# Patient Record
Sex: MALE | Race: WHITE | NOT HISPANIC OR LATINO | Employment: UNEMPLOYED | URBAN - METROPOLITAN AREA
[De-identification: names, ages, dates, MRNs, and addresses within clinical notes are randomized per-mention and may not be internally consistent; named-entity substitution may affect disease eponyms.]

---

## 2021-04-08 DIAGNOSIS — Z23 ENCOUNTER FOR IMMUNIZATION: ICD-10-CM

## 2023-11-05 ENCOUNTER — OFFICE VISIT (OUTPATIENT)
Dept: URGENT CARE | Facility: CLINIC | Age: 68
End: 2023-11-05
Payer: COMMERCIAL

## 2023-11-05 VITALS
RESPIRATION RATE: 20 BRPM | DIASTOLIC BLOOD PRESSURE: 76 MMHG | SYSTOLIC BLOOD PRESSURE: 149 MMHG | HEART RATE: 74 BPM | OXYGEN SATURATION: 95 % | TEMPERATURE: 100 F

## 2023-11-05 DIAGNOSIS — S29.012A STRAIN OF LATISSIMUS DORSI MUSCLE, INITIAL ENCOUNTER: Primary | ICD-10-CM

## 2023-11-05 PROCEDURE — S9083 URGENT CARE CENTER GLOBAL: HCPCS | Performed by: PREVENTIVE MEDICINE

## 2023-11-05 PROCEDURE — 99203 OFFICE O/P NEW LOW 30 MIN: CPT | Performed by: PREVENTIVE MEDICINE

## 2023-11-05 RX ORDER — ALBUTEROL SULFATE 90 UG/1
AEROSOL, METERED RESPIRATORY (INHALATION)
COMMUNITY

## 2023-11-05 NOTE — PATIENT INSTRUCTIONS
Ice 4 times a day. 2 ibuprofen 4 times a day. Smear Voltaren cream 3 times a day over the tender area.

## 2023-11-05 NOTE — PROGRESS NOTES
[facility]        NAME: Ruby Miller is a 77 y.o. male  : 1955    MRN: [id number]  DATE: 2023  TIME: 9:36 AM    Assessment and Plan   Strain of latissimus dorsi muscle, initial encounter [L23.659J]  6. Strain of latissimus dorsi muscle, initial encounter              Patient Instructions       Follow up with PCP in 3-5 days. Proceed to  ER if symptoms worsen. Chief Complaint     Chief Complaint   Patient presents with    Back Pain     Started 3 days ago. Had been moving furniture the day before. Pain is on L middle back. States of discomfort with deep breath. History of Present Illness       Moving furniture he felt sudden pain in the left upper back. Hurts when he deep breathes or turns. Back Pain        Review of Systems   Review of Systems   Musculoskeletal:  Positive for back pain. Current Medications       Current Outpatient Medications:     albuterol (PROVENTIL HFA,VENTOLIN HFA) 90 mcg/act inhaler, INHALE 2 PUFFS EVERY 6 HOURS AS NEEDED FOR WHEEZING, Disp: , Rfl:     Current Allergies     Allergies as of 2023    (No Known Allergies)            The following portions of the patient's history were reviewed and updated as appropriate: allergies, current medications, past family history, past medical history, past social history, past surgical history and problem list.     No past medical history on file. No past surgical history on file. No family history on file. Medications have been verified. Objective   /76   Pulse 74   Temp 100 °F (37.8 °C) (Temporal)   Resp 20   SpO2 95%   No LMP for male patient. Physical Exam     Physical Exam  Musculoskeletal:      Comments: Point tenderness over the insertion of the left upper latissimus dorsi muscle.

## 2024-08-27 ENCOUNTER — OFFICE VISIT (OUTPATIENT)
Dept: URGENT CARE | Facility: CLINIC | Age: 69
End: 2024-08-27
Payer: COMMERCIAL

## 2024-08-27 VITALS
HEIGHT: 66 IN | RESPIRATION RATE: 18 BRPM | WEIGHT: 225 LBS | TEMPERATURE: 98.3 F | OXYGEN SATURATION: 95 % | DIASTOLIC BLOOD PRESSURE: 80 MMHG | HEART RATE: 73 BPM | BODY MASS INDEX: 36.16 KG/M2 | SYSTOLIC BLOOD PRESSURE: 170 MMHG

## 2024-08-27 DIAGNOSIS — J45.21 MILD INTERMITTENT ASTHMA WITH ACUTE EXACERBATION: Primary | ICD-10-CM

## 2024-08-27 PROCEDURE — 99213 OFFICE O/P EST LOW 20 MIN: CPT | Performed by: NURSE PRACTITIONER

## 2024-08-27 PROCEDURE — S9083 URGENT CARE CENTER GLOBAL: HCPCS | Performed by: NURSE PRACTITIONER

## 2024-08-27 RX ORDER — PREDNISONE 10 MG/1
TABLET ORAL
Qty: 30 TABLET | Refills: 0 | Status: SHIPPED | OUTPATIENT
Start: 2024-08-27

## 2024-08-27 NOTE — PROGRESS NOTES
[facility]        NAME: Miguel Lyles is a 69 y.o. male  : 1955    MRN: [id number]  DATE: 2024  TIME: 5:02 PM    Assessment and Plan   Mild intermittent asthma with acute exacerbation [J45.21]  1. Mild intermittent asthma with acute exacerbation  predniSONE 10 mg tablet        --Patient prefers oral steroid rather than inhaled steroid at this time.     Patient Instructions     Patient Instructions   --Rx prednisone as prescribed. Take with food.   --Rescue inhaler as needed  --OTC Mucinex as needed  --Consider OTC Claritin or Allegra  --Avoid known triggers  --Seek re-evaluation if no improvement/worsening over the next 3-5 days.       If tests have been performed at [facility], our office will contact you with results if changes need to be made to the care plan discussed with you at the visit.  [facility]    Chief Complaint     Chief Complaint   Patient presents with    chest congestion     Couple days ago pt started having chest congestion. Thinks it is seasonal allergies but cannot clear his throat. Has been taking his inhaler an relieves symptoms briefly.         History of Present Illness       Here with complaints of asthma exacerbation.   Dry cough, intermittent wheezing over the past few days.    Using rescue inhaler more frequently than normal.  Helping temporarily.    No fever, chest pain, dyspnea.    Occasional sneezing. No nasal congestion, sore throat.  No OTC meds.          Review of Systems   Review of Systems   Constitutional:  Negative for fever.   HENT:  Negative for rhinorrhea and sore throat.    Respiratory:  Positive for cough, chest tightness and wheezing. Negative for shortness of breath.    Cardiovascular:  Negative for chest pain.         Current Medications       Current Outpatient Medications:     albuterol (PROVENTIL HFA,VENTOLIN HFA) 90 mcg/act inhaler, INHALE 2 PUFFS EVERY 6 HOURS AS NEEDED FOR WHEEZING, Disp: ,  "Rfl:     predniSONE 10 mg tablet, TAKE  5 TAB DAILY X 2 DAYS, THEN 4 TAB DAILY X 2 DAYS, THEN 3 TAB DAILY X 2 DAYS, THEN 2 TAB DAILY X 2 DAYS, THEN 1 TAB DAILY X 2 DAYS, Disp: 30 tablet, Rfl: 0    Current Allergies     Allergies as of 08/27/2024 - Reviewed 08/27/2024   Allergen Reaction Noted    Pollen extract Nasal Congestion 08/27/2024            The following portions of the patient's history were reviewed and updated as appropriate: allergies, current medications, past family history, past medical history, past social history, past surgical history and problem list.     History reviewed. No pertinent past medical history.    History reviewed. No pertinent surgical history.    No family history on file.      Medications have been verified.        Objective   /80 (BP Location: Right arm, Patient Position: Sitting, Cuff Size: Standard)   Pulse 73   Temp 98.3 °F (36.8 °C) (Temporal)   Resp 18   Ht 5' 6\" (1.676 m)   Wt 102 kg (225 lb)   SpO2 95%   BMI 36.32 kg/m²   No LMP for male patient.       Physical Exam     Physical Exam  Constitutional:       General: He is not in acute distress.     Appearance: He is not toxic-appearing.   HENT:      Head: Normocephalic.      Nose: No congestion or rhinorrhea.      Mouth/Throat:      Pharynx: No oropharyngeal exudate or posterior oropharyngeal erythema.   Cardiovascular:      Rate and Rhythm: Normal rate and regular rhythm.   Pulmonary:      Effort: Pulmonary effort is normal. No respiratory distress.      Breath sounds: Normal breath sounds. No stridor. No wheezing, rhonchi or rales.      Comments: Breathing easy.  RR=16. No cough noted.   Chest:      Chest wall: No tenderness.   Neurological:      Mental Status: He is alert.                   "

## 2024-08-27 NOTE — PATIENT INSTRUCTIONS
--Rx prednisone as prescribed. Take with food.   --Rescue inhaler as needed  --OTC Mucinex as needed  --Consider OTC Claritin or Allegra  --Avoid known triggers  --Seek re-evaluation if no improvement/worsening over the next 3-5 days.

## 2024-10-16 ENCOUNTER — OFFICE VISIT (OUTPATIENT)
Dept: URGENT CARE | Facility: CLINIC | Age: 69
End: 2024-10-16
Payer: COMMERCIAL

## 2024-10-16 VITALS
RESPIRATION RATE: 16 BRPM | HEART RATE: 80 BPM | WEIGHT: 225 LBS | TEMPERATURE: 98.9 F | DIASTOLIC BLOOD PRESSURE: 84 MMHG | BODY MASS INDEX: 36.16 KG/M2 | OXYGEN SATURATION: 92 % | SYSTOLIC BLOOD PRESSURE: 161 MMHG | HEIGHT: 66 IN

## 2024-10-16 DIAGNOSIS — J40 BRONCHITIS: Primary | ICD-10-CM

## 2024-10-16 PROCEDURE — S9083 URGENT CARE CENTER GLOBAL: HCPCS | Performed by: NURSE PRACTITIONER

## 2024-10-16 PROCEDURE — 99213 OFFICE O/P EST LOW 20 MIN: CPT | Performed by: NURSE PRACTITIONER

## 2024-10-16 RX ORDER — PREDNISONE 20 MG/1
40 TABLET ORAL DAILY
Qty: 10 TABLET | Refills: 0 | Status: SHIPPED | OUTPATIENT
Start: 2024-10-16

## 2024-10-16 RX ORDER — AZITHROMYCIN 250 MG/1
TABLET, FILM COATED ORAL
Qty: 6 TABLET | Refills: 0 | Status: SHIPPED | OUTPATIENT
Start: 2024-10-16 | End: 2024-10-20

## 2024-10-16 NOTE — PROGRESS NOTES
[facility]        NAME: Miguel Lyles is a 69 y.o. male  : 1955    MRN: [id number]  DATE: 2024  TIME: 3:45 PM    Assessment and Plan   Bronchitis [J40]  1. Bronchitis  azithromycin (ZITHROMAX) 250 mg tablet    predniSONE 20 mg tablet            Patient Instructions   Albuterol inhaler 2 puffs every 4-6 hours   Prednisone daily for 5 days - monitor your diet during this time   Increase fluid intake  Tylenol/Motrin for pain or fever  If symptoms worsen, proceed to the ER.   Follow up with your PCP    Follow up with PCP in 3-5 days.  Proceed to  ER if symptoms worsen.    Chief Complaint     Chief Complaint   Patient presents with    Cold Like Symptoms     Coughing/wheezing and inhaler is not working. Pt reports that he has asthma and thinks that he is getting bronchitis. He stated that this is day 2          History of Present Illness       Patient is a 69 year old male presenting with 2 days of cough, chest congestion, and sinus congestion. He feels achy but denies fever. He has been using mucinex and and inhaler without relief. Cough and wheezing are worse at night.         Review of Systems   Review of Systems   Constitutional:  Negative for activity change, chills and fever.   HENT:  Positive for congestion. Negative for ear pain and sore throat.    Respiratory:  Positive for cough and wheezing.    Musculoskeletal:  Positive for myalgias.         Current Medications       Current Outpatient Medications:     azithromycin (ZITHROMAX) 250 mg tablet, Take 2 tablets today then 1 tablet daily x 4 days, Disp: 6 tablet, Rfl: 0    predniSONE 20 mg tablet, Take 2 tablets (40 mg total) by mouth daily, Disp: 10 tablet, Rfl: 0    albuterol (PROVENTIL HFA,VENTOLIN HFA) 90 mcg/act inhaler, INHALE 2 PUFFS EVERY 6 HOURS AS NEEDED FOR WHEEZING (Patient not taking: Reported on 10/16/2024), Disp: , Rfl:     predniSONE 10 mg tablet, TAKE  5 TAB DAILY X 2 DAYS, THEN 4 TAB DAILY X 2 DAYS, THEN 3 TAB DAILY X  "2 DAYS, THEN 2 TAB DAILY X 2 DAYS, THEN 1 TAB DAILY X 2 DAYS (Patient not taking: Reported on 10/16/2024), Disp: 30 tablet, Rfl: 0    Current Allergies     Allergies as of 10/16/2024 - Reviewed 10/16/2024   Allergen Reaction Noted    Pollen extract Nasal Congestion 08/27/2024            The following portions of the patient's history were reviewed and updated as appropriate: allergies, current medications, past family history, past medical history, past social history, past surgical history and problem list.     History reviewed. No pertinent past medical history.    History reviewed. No pertinent surgical history.    No family history on file.      Medications have been verified.        Objective   /84   Pulse 80   Temp 98.9 °F (37.2 °C)   Resp 16   Ht 5' 6\" (1.676 m)   Wt 102 kg (225 lb)   SpO2 92%   BMI 36.32 kg/m²        Physical Exam     Physical Exam  Vitals reviewed.   Constitutional:       General: He is awake. He is not in acute distress.     Appearance: Normal appearance.   HENT:      Head: Normocephalic.      Right Ear: Hearing, tympanic membrane and ear canal normal.      Left Ear: Hearing, tympanic membrane and ear canal normal.      Nose: Nose normal.      Mouth/Throat:      Lips: Pink.      Pharynx: Oropharynx is clear.   Cardiovascular:      Rate and Rhythm: Normal rate and regular rhythm.      Heart sounds: Normal heart sounds, S1 normal and S2 normal.   Pulmonary:      Effort: Pulmonary effort is normal.      Breath sounds: Examination of the right-upper field reveals wheezing. Examination of the left-upper field reveals wheezing. Examination of the right-middle field reveals wheezing. Examination of the left-middle field reveals wheezing. Examination of the right-lower field reveals wheezing. Examination of the left-lower field reveals wheezing. Wheezing present. No decreased breath sounds.   Skin:     General: Skin is warm and moist.   Neurological:      General: No focal deficit " present.      Mental Status: He is alert and oriented to person, place, and time.   Psychiatric:         Behavior: Behavior is cooperative.

## 2024-10-16 NOTE — PATIENT INSTRUCTIONS
Albuterol inhaler 2 puffs every 4-6 hours   Prednisone daily for 5 days - monitor your diet during this time   Increase fluid intake  Tylenol/Motrin for pain or fever  If symptoms worsen, proceed to the ER.   Follow up with your PCP    Patient Education     Bronchitis, Adult ED   General Information   You came to the Emergency Department (ED) for acute bronchitis. This is an infection of the bronchi which are tubes that carry air into your lungs. Most of the time, this infection is caused by a virus so an antibiotic won’t help. Your cough should get better within 2 to 3 weeks, but may take a bit longer.  What care is needed at home?   Call your regular doctor to let them know you were in the ED. Make a follow-up appointment if you were told to.  Do not smoke or be in smoke-filled places. Avoid other things that may cause breathing problems like fumes, pollution, dust, and other common allergens.  Drink lots of water, juice, or broth to replace fluids lost in runny nose and fever.  If you have medicines to take when you are feeling short of breath, be sure to carry them with you. Then, you can take them when needed.  If you smoke, stop.  Take warm, steamy showers to help soothe the cough.  Use a cool mist humidifier. This may make it easier to breathe.  Use hard candy or cough drops to soothe sore throat and cough.  Wash your hands often. This will help prevent you from spreading germs to others.  When do I need to get emergency help?   Call for an ambulance right away if:   You are having so much trouble breathing that you can only say one or two words at a time.  You need to sit upright at all times to be able to breathe and or cannot lie down.  Return to the ED if:   You have trouble breathing when talking or sitting still.  When do I need to call the doctor?   You have a fever of 100.4°F (38°C) or higher or chills.  You have chest pain when you cough, have trouble breathing but can still talk in full sentences,  or cough up blood.  You develop a barking cough.  You are still coughing in 3 weeks.  You have new or worsening symptoms.  Last Reviewed Date   2020-07-22  Consumer Information Use and Disclaimer   This generalized information is a limited summary of diagnosis, treatment, and/or medication information. It is not meant to be comprehensive and should be used as a tool to help the user understand and/or assess potential diagnostic and treatment options. It does NOT include all information about conditions, treatments, medications, side effects, or risks that may apply to a specific patient. It is not intended to be medical advice or a substitute for the medical advice, diagnosis, or treatment of a health care provider based on the health care provider's examination and assessment of a patient’s specific and unique circumstances. Patients must speak with a health care provider for complete information about their health, medical questions, and treatment options, including any risks or benefits regarding use of medications. This information does not endorse any treatments or medications as safe, effective, or approved for treating a specific patient. [employer] and its affiliates disclaim any warranty or liability relating to this information or the use thereof. The use of this information is governed by the Terms of Use, available at https://www.[employer].com/en/know/clinical-effectiveness-terms   Copyright   Copyright © 2024 [employer], Inc[employer] and its affiliates and/or licensors. All rights reserved.

## 2024-10-22 ENCOUNTER — APPOINTMENT (OUTPATIENT)
Dept: RADIOLOGY | Facility: CLINIC | Age: 69
End: 2024-10-22
Payer: COMMERCIAL

## 2024-10-22 ENCOUNTER — OFFICE VISIT (OUTPATIENT)
Dept: URGENT CARE | Facility: CLINIC | Age: 69
End: 2024-10-22
Payer: COMMERCIAL

## 2024-10-22 VITALS
TEMPERATURE: 98.2 F | DIASTOLIC BLOOD PRESSURE: 76 MMHG | OXYGEN SATURATION: 94 % | HEART RATE: 85 BPM | RESPIRATION RATE: 24 BRPM | SYSTOLIC BLOOD PRESSURE: 143 MMHG

## 2024-10-22 DIAGNOSIS — R06.2 WHEEZING: Primary | ICD-10-CM

## 2024-10-22 DIAGNOSIS — J45.21 MILD INTERMITTENT ASTHMA WITH ACUTE EXACERBATION: ICD-10-CM

## 2024-10-22 DIAGNOSIS — R06.2 WHEEZING: ICD-10-CM

## 2024-10-22 PROCEDURE — S9083 URGENT CARE CENTER GLOBAL: HCPCS | Performed by: PHYSICIAN ASSISTANT

## 2024-10-22 PROCEDURE — 99214 OFFICE O/P EST MOD 30 MIN: CPT | Performed by: PHYSICIAN ASSISTANT

## 2024-10-22 PROCEDURE — 71046 X-RAY EXAM CHEST 2 VIEWS: CPT

## 2024-10-22 RX ORDER — IPRATROPIUM BROMIDE AND ALBUTEROL SULFATE 2.5; .5 MG/3ML; MG/3ML
3 SOLUTION RESPIRATORY (INHALATION)
Status: DISCONTINUED | OUTPATIENT
Start: 2024-10-22 | End: 2024-10-22

## 2024-10-22 RX ORDER — IPRATROPIUM BROMIDE AND ALBUTEROL SULFATE 2.5; .5 MG/3ML; MG/3ML
3 SOLUTION RESPIRATORY (INHALATION) ONCE
Status: DISCONTINUED | OUTPATIENT
Start: 2024-10-22 | End: 2024-10-22

## 2024-10-22 RX ORDER — ALBUTEROL SULFATE 0.83 MG/ML
2.5 SOLUTION RESPIRATORY (INHALATION) EVERY 6 HOURS PRN
Qty: 75 ML | Refills: 0 | Status: SHIPPED | OUTPATIENT
Start: 2024-10-22

## 2024-10-22 RX ADMIN — IPRATROPIUM BROMIDE AND ALBUTEROL SULFATE 3 ML: 2.5; .5 SOLUTION RESPIRATORY (INHALATION) at 16:42

## 2024-10-22 NOTE — PROGRESS NOTES
[facility]        NAME: Miguel Lyles is a 69 y.o. male  : 1955    MRN: [id number]  DATE: 2024  TIME: 4:41 PM    Assessment and Plan   Wheezing [R06.2]  1. Wheezing  XR chest pa and lateral    ipratropium-albuterol (DUO-NEB) 0.5-2.5 mg/3 mL inhalation solution 3 mL    albuterol (2.5 mg/3 mL) 0.083 % nebulizer solution    Nebulizer    Nebulizer Supplies    Ambulatory Referral to Pulmonology      2. Mild intermittent asthma with acute exacerbation  Ambulatory Referral to Pulmonology        Ports with worsening shortness of breath, wheezing, chest tightness and cough.  Recommend chest x-ray for further evaluation to x-ray demonstrates no acute cardiopulmonary disease.  Commend administration of DuoNeb in clinic.  Patient is provided with orders for nebulizer and nebulizer supplies and her nebulizer solution.  Patient is instructed to use albuterol nebulizer solution daily for the next week with rescue inhaler as needed.  He is referred to pulmonology for definitive treatment management strict ER precautions given.  Patient with improved symptoms after DuoNeb discharged stable condition.    Patient Instructions     Patient Instructions   DuoNeb administered in clinic today.  No albuterol for the next 4 to 6 hours.  Supplies for nebulizer and albuterol for nebulizer provided.  Use twice daily for the next week and supplement with albuterol inhaler as needed every 4-6 hours.  If symptoms or not improved in 2 to 3 days follow-up with PCP.  Follow-up with pulmonologist ASAP.  If symptoms worsen or new symptoms develop report to the emergency room immediately.    Follow up with PCP in 3-5 days.  Proceed to  ER if symptoms worsen.    If tests have been performed at [facility], our office will contact you with results if changes need to be made to the care plan discussed with you at the visit. You can review your full results on [facility].     Chief Complaint     Chief Complaint   Patient  presents with    Wheezing     Seen here on 10/16. Placed on Azithromycin, Prednisone and Albuterol MDI. States he improved but the problem never resolved. Coughing chest tightness, wheezing.          History of Present Illness       69-year-old male presents with continued cough and wheezing.  Patient reports that he initially developed symptoms approximately 1 week ago.  He was seen in our clinic and was given albuterol inhaler, and prednisone.  Patient reports that symptoms improved slightly but have continued and are worsening again.  He notes some pain with cough only noting muscle soreness and tightness.  He also notes chest tightness and some shortness of breath particularly with activity.  Patient denies fever and chills.  Patient also notes postnasal drip.    Wheezing  Associated symptoms include coughing and wheezing. Pertinent negatives include no chest pain, rhinorrhea or sore throat.       Review of Systems   Review of Systems   Constitutional:  Negative for chills and fever.   HENT:  Positive for postnasal drip. Negative for congestion, rhinorrhea and sore throat.    Respiratory:  Positive for cough, chest tightness, shortness of breath and wheezing.    Cardiovascular:  Negative for chest pain.         Current Medications       Current Outpatient Medications:     albuterol (2.5 mg/3 mL) 0.083 % nebulizer solution, Take 3 mL (2.5 mg total) by nebulization every 6 (six) hours as needed for wheezing or shortness of breath, Disp: 75 mL, Rfl: 0    albuterol (PROVENTIL HFA,VENTOLIN HFA) 90 mcg/act inhaler, INHALE 2 PUFFS EVERY 6 HOURS AS NEEDED FOR WHEEZING (Patient not taking: Reported on 10/16/2024), Disp: , Rfl:     predniSONE 10 mg tablet, TAKE  5 TAB DAILY X 2 DAYS, THEN 4 TAB DAILY X 2 DAYS, THEN 3 TAB DAILY X 2 DAYS, THEN 2 TAB DAILY X 2 DAYS, THEN 1 TAB DAILY X 2 DAYS (Patient not taking: Reported on 10/16/2024), Disp: 30 tablet, Rfl: 0    predniSONE 20 mg tablet, Take 2 tablets (40 mg total) by mouth  daily (Patient not taking: Reported on 10/22/2024), Disp: 10 tablet, Rfl: 0    Current Facility-Administered Medications:     ipratropium-albuterol (DUO-NEB) 0.5-2.5 mg/3 mL inhalation solution 3 mL, 3 mL, Nebulization, Q6H,     Current Allergies     Allergies as of 10/22/2024 - Reviewed 10/22/2024   Allergen Reaction Noted    Pollen extract Nasal Congestion 08/27/2024            The following portions of the patient's history were reviewed and updated as appropriate: allergies, current medications, past family history, past medical history, past social history, past surgical history and problem list.     No past medical history on file.    No past surgical history on file.    No family history on file.      Medications have been verified.        Objective   /76   Pulse 85   Temp 98.2 °F (36.8 °C) (Temporal)   Resp (!) 24   SpO2 94%   No LMP for male patient.       Physical Exam     Physical Exam  Vitals and nursing note reviewed.   Constitutional:       General: He is awake. He is not in acute distress.     Appearance: Normal appearance. He is well-developed and well-groomed. He is not ill-appearing, toxic-appearing or diaphoretic.   HENT:      Head: Normocephalic and atraumatic.      Right Ear: Hearing and external ear normal.      Left Ear: Hearing and external ear normal.   Eyes:      General: Lids are normal. Vision grossly intact. Gaze aligned appropriately.   Cardiovascular:      Rate and Rhythm: Normal rate.   Pulmonary:      Effort: Tachypnea and accessory muscle usage present.      Breath sounds: Examination of the right-upper field reveals wheezing. Examination of the left-upper field reveals wheezing. Examination of the right-middle field reveals wheezing. Examination of the left-middle field reveals wheezing. Examination of the right-lower field reveals wheezing. Examination of the left-lower field reveals wheezing. Wheezing present.      Comments: Patient is speaking in full sentences with no  "increased respiratory effort.  Audible expiratory wheeze.   Musculoskeletal:      Cervical back: Normal range of motion.   Skin:     General: Skin is warm and dry.   Neurological:      Mental Status: He is alert and oriented to person, place, and time.      Coordination: Coordination is intact.      Gait: Gait is intact.   Psychiatric:         Attention and Perception: Attention and perception normal.         Mood and Affect: Mood and affect normal.         Speech: Speech normal.         Behavior: Behavior normal. Behavior is cooperative.         Mini neb    Performed by: Jeannie Blakely PA-C  Authorized by: Jeannie Blakely PA-C  Universal Protocol:  procedure performed by consultantConsent: Verbal consent obtained.  Risks and benefits: risks, benefits and alternatives were discussed  Consent given by: patient  Time out: Immediately prior to procedure a \"time out\" was called to verify the correct patient, procedure, equipment, support staff and site/side marked as required.  Patient understanding: patient states understanding of the procedure being performed  Patient identity confirmed: verbally with patient    Number of treatments:  1  Treatment 1:   Pre-Procedure     Symptoms:  Wheezing, cough and shortness of breath    Lung Sounds:  Wheezing throuhgout    HR:  85    RR:  24    SP02:  94    Medication Administered:  Duoneb - Albuterol 2.5 mg/Atrovent 0.5 mg  Post-Procedure     Symptoms:  Cough    Lung sounds:  Improved air movement, wheezing resolved.    HR:  89    RR:  18    SP02:  96        Note: Portions of this record may have been created with voice recognition software. Occasional wrong word or \"sound a like\" substitutions may have occurred due to the inherent limitations of voice recognition software. Please read the chart carefully and recognize, using context, where substitutions have occurred.*      "

## 2024-10-22 NOTE — PATIENT INSTRUCTIONS
DuoNeb administered in clinic today.  No albuterol for the next 4 to 6 hours.  Supplies for nebulizer and albuterol for nebulizer provided.  Use twice daily for the next week and supplement with albuterol inhaler as needed every 4-6 hours.  If symptoms or not improved in 2 to 3 days follow-up with PCP.  Follow-up with pulmonologist ASAP.  If symptoms worsen or new symptoms develop report to the emergency room immediately.

## 2025-04-29 ENCOUNTER — APPOINTMENT (OUTPATIENT)
Dept: RADIOLOGY | Facility: CLINIC | Age: 70
End: 2025-04-29
Attending: PHYSICIAN ASSISTANT
Payer: COMMERCIAL

## 2025-04-29 ENCOUNTER — OFFICE VISIT (OUTPATIENT)
Dept: URGENT CARE | Facility: CLINIC | Age: 70
End: 2025-04-29
Payer: COMMERCIAL

## 2025-04-29 VITALS
WEIGHT: 235 LBS | RESPIRATION RATE: 22 BRPM | TEMPERATURE: 97.7 F | OXYGEN SATURATION: 92 % | DIASTOLIC BLOOD PRESSURE: 70 MMHG | HEART RATE: 83 BPM | SYSTOLIC BLOOD PRESSURE: 150 MMHG | HEIGHT: 66 IN | BODY MASS INDEX: 37.77 KG/M2

## 2025-04-29 DIAGNOSIS — J45.901 MODERATE ASTHMA WITH ACUTE EXACERBATION, UNSPECIFIED WHETHER PERSISTENT: Primary | ICD-10-CM

## 2025-04-29 DIAGNOSIS — R06.2 WHEEZING: ICD-10-CM

## 2025-04-29 DIAGNOSIS — R06.02 SOB (SHORTNESS OF BREATH): ICD-10-CM

## 2025-04-29 PROCEDURE — 71046 X-RAY EXAM CHEST 2 VIEWS: CPT

## 2025-04-29 PROCEDURE — S9083 URGENT CARE CENTER GLOBAL: HCPCS

## 2025-04-29 PROCEDURE — 94640 AIRWAY INHALATION TREATMENT: CPT | Performed by: NURSE PRACTITIONER

## 2025-04-29 PROCEDURE — 99214 OFFICE O/P EST MOD 30 MIN: CPT

## 2025-04-29 RX ORDER — IPRATROPIUM BROMIDE AND ALBUTEROL SULFATE 2.5; .5 MG/3ML; MG/3ML
3 SOLUTION RESPIRATORY (INHALATION) ONCE
Status: COMPLETED | OUTPATIENT
Start: 2025-04-29 | End: 2025-04-29

## 2025-04-29 RX ORDER — PREDNISONE 10 MG/1
50 TABLET ORAL DAILY
Qty: 25 TABLET | Refills: 0 | Status: SHIPPED | OUTPATIENT
Start: 2025-04-29 | End: 2025-05-04

## 2025-04-29 RX ORDER — IPRATROPIUM BROMIDE AND ALBUTEROL SULFATE 2.5; .5 MG/3ML; MG/3ML
3 SOLUTION RESPIRATORY (INHALATION)
Status: DISCONTINUED | OUTPATIENT
Start: 2025-04-29 | End: 2025-04-29

## 2025-04-29 RX ORDER — ALBUTEROL SULFATE 0.83 MG/ML
2.5 SOLUTION RESPIRATORY (INHALATION) EVERY 6 HOURS PRN
Qty: 75 ML | Refills: 0 | Status: SHIPPED | OUTPATIENT
Start: 2025-04-29

## 2025-04-29 RX ADMIN — IPRATROPIUM BROMIDE AND ALBUTEROL SULFATE 3 ML: 2.5; .5 SOLUTION RESPIRATORY (INHALATION) at 08:58

## 2025-04-29 NOTE — PROGRESS NOTES
[facility]        NAME: Miguel Lyles is a 69 y.o. male  : 1955    MRN: [id number]  DATE: 2025  TIME: 9:41 AM    Assessment and Plan   Moderate asthma with acute exacerbation, unspecified whether persistent [J45.901]  1. Moderate asthma with acute exacerbation, unspecified whether persistent  ipratropium-albuterol (DUO-NEB) 0.5-2.5 mg/3 mL inhalation solution 3 mL    albuterol (2.5 mg/3 mL) 0.083 % nebulizer solution    predniSONE 10 mg tablet    Ambulatory Referral to Pulmonology    DISCONTINUED: ipratropium-albuterol (DUO-NEB) 0.5-2.5 mg/3 mL inhalation solution 3 mL    DISCONTINUED: ipratropium-albuterol (DUO-NEB) 0.5-2.5 mg/3 mL inhalation solution 3 mL      2. SOB (shortness of breath)  XR chest pa and lateral      3. Wheezing        Duo Neb treatment given in clinic  Recommended CXR related to symptoms to rule out cardiopulmonary disease  Continue nebulizer treatments at home as prescribed.   Begin prednisone as ordered  Follow up with pulmonologist as previously ordered in 10/24 for treatment related to asthma   Outpatient referral placed for pulmonologist        Patient Instructions     Patient Instructions   Duo Neb treatment given in clinic  Continue nebulizer treatments at home as prescribed.   Begin prednisone as ordered.  Complete entire 5 day dosage  Follow up with pulmonologist as previously ordered in 10/24 for treatment related to asthma           Follow up with PCP in 3-5 days.  Proceed to  ER if symptoms worsen.    If tests are performed, our office will contact you with results only if changes need to made to the care plan discussed with you at the visit. [facility]    Chief Complaint     Chief Complaint   Patient presents with    Cough    Cold Like Symptoms     Cough with SOB and audible wheezing. S/S started approx 2 weeks ago.          History of Present Illness       This is a 69 yr old male with a PMH significant for  allergy induced asthma.  He stated he began with cold like symptoms on Sunday which progressed to a productive cough and wheezing which worsened so he decided to seek treatment.  He has been using his MDI at home 4-5 times per day pretty consistently.  He was advised in 10/24 to follow up with a pulmonologist in regards to his asthma but he has not done that.      Cough  This is a chronic problem. The current episode started 1 to 4 weeks ago. The cough is Productive of sputum. Associated symptoms include postnasal drip and wheezing. Pertinent negatives include no chest pain, chills, ear pain, fever, rash, sore throat or shortness of breath. The symptoms are aggravated by pollens. He has tried a beta-agonist inhaler, OTC inhaler and rest for the symptoms. The treatment provided mild relief. His past medical history is significant for asthma.       Review of Systems   Review of Systems   Constitutional:  Positive for fatigue. Negative for activity change, chills and fever.   HENT:  Positive for congestion and postnasal drip. Negative for ear pain and sore throat.    Eyes:  Negative for pain and visual disturbance.   Respiratory:  Positive for cough, chest tightness and wheezing. Negative for shortness of breath.    Cardiovascular:  Negative for chest pain and palpitations.   Gastrointestinal:  Positive for diarrhea and nausea. Negative for abdominal pain and vomiting.   Genitourinary:  Negative for dysuria and hematuria.   Musculoskeletal:  Negative for arthralgias and back pain.   Skin:  Negative for color change and rash.   Neurological:  Negative for seizures and syncope.   All other systems reviewed and are negative.        Current Medications       Current Outpatient Medications:     albuterol (2.5 mg/3 mL) 0.083 % nebulizer solution, Take 3 mL (2.5 mg total) by nebulization every 6 (six) hours as needed for wheezing or shortness of breath, Disp: 75 mL, Rfl: 0    predniSONE 10 mg tablet, Take 5 tablets (50 mg  "total) by mouth daily for 5 days, Disp: 25 tablet, Rfl: 0    albuterol (2.5 mg/3 mL) 0.083 % nebulizer solution, Take 3 mL (2.5 mg total) by nebulization every 6 (six) hours as needed for wheezing or shortness of breath, Disp: 75 mL, Rfl: 0    albuterol (PROVENTIL HFA,VENTOLIN HFA) 90 mcg/act inhaler, INHALE 2 PUFFS EVERY 6 HOURS AS NEEDED FOR WHEEZING (Patient not taking: Reported on 10/16/2024), Disp: , Rfl:     predniSONE 20 mg tablet, Take 2 tablets (40 mg total) by mouth daily (Patient not taking: Reported on 10/22/2024), Disp: 10 tablet, Rfl: 0  No current facility-administered medications for this visit.    Current Allergies     Allergies as of 04/29/2025 - Reviewed 04/29/2025   Allergen Reaction Noted    Pollen extract Nasal Congestion 08/27/2024            The following portions of the patient's history were reviewed and updated as appropriate: allergies, current medications, past family history, past medical history, past social history, past surgical history and problem list.     History reviewed. No pertinent past medical history.    History reviewed. No pertinent surgical history.    History reviewed. No pertinent family history.      Medications have been verified.        Objective   /70   Pulse 83   Temp 97.7 °F (36.5 °C)   Resp 22   Ht 5' 6\" (1.676 m)   Wt 107 kg (235 lb)   SpO2 92%   BMI 37.93 kg/m²        Physical Exam     Physical Exam  Vitals and nursing note reviewed.   Constitutional:       General: He is not in acute distress.     Appearance: Normal appearance. He is not ill-appearing.   HENT:      Head: Normocephalic and atraumatic.      Right Ear: Tympanic membrane, ear canal and external ear normal.      Left Ear: Tympanic membrane, ear canal and external ear normal.      Nose: Nose normal.      Mouth/Throat:      Mouth: Mucous membranes are moist.      Pharynx: Oropharynx is clear.   Eyes:      Pupils: Pupils are equal, round, and reactive to light.   Cardiovascular:      Rate " and Rhythm: Normal rate and regular rhythm.      Heart sounds: Normal heart sounds.   Pulmonary:      Effort: Tachypnea, prolonged expiration and respiratory distress present.      Breath sounds: Wheezing present. No rhonchi or rales.   Abdominal:      General: Abdomen is flat.      Palpations: Abdomen is soft.   Musculoskeletal:         General: Normal range of motion.      Cervical back: Normal range of motion.   Skin:     General: Skin is warm and dry.   Neurological:      Mental Status: He is alert.   Psychiatric:         Mood and Affect: Mood normal.         Behavior: Behavior normal.                  Mini neb    Performed by: ALLAN Tejada  Authorized by: ALLAN Tejada  Universal Protocol:  Procedure performed by:  Consent: Verbal consent obtained. Written consent not obtained.  Risks and benefits: risks, benefits and alternatives were discussed  Consent given by: patient  Timeout called at: 4/29/2025 9:40 AM.  Patient understanding: patient states understanding of the procedure being performed  Patient consent: the patient's understanding of the procedure matches consent given  Procedure consent: procedure consent matches procedure scheduled  Relevant documents: relevant documents present and verified  Test results: test results available and properly labeled  Site marked: the operative site was marked  Radiology Images displayed and confirmed. If images not available, report reviewed: imaging studies available  Patient identity confirmed: verbally with patient    Number of treatments:  1  Treatment 1:   Pre-Procedure     Symptoms:  Wheezing    Lung Sounds:  Scattered wheezes throughout lung fields    HR:  83    RR:  22    SP02:  92    Medication Administered:  Duoneb - Albuterol 2.5 mg/Atrovent 0.5 mg  Post-Procedure     Symptoms:  Wheezing (pt states he feels better and will continue with his nebulizer treatments at home)      fair plus throughout static and dynamic balance

## 2025-04-29 NOTE — PATIENT INSTRUCTIONS
Duo Neb treatment given in clinic  Continue nebulizer treatments at home as prescribed.   Begin prednisone as ordered.  Complete entire 5 day dosage  Follow up with pulmonologist as previously ordered in 10/24 for treatment related to asthma

## 2025-04-30 ENCOUNTER — CONSULT (OUTPATIENT)
Dept: PULMONOLOGY | Facility: CLINIC | Age: 70
End: 2025-04-30
Payer: COMMERCIAL

## 2025-04-30 VITALS
DIASTOLIC BLOOD PRESSURE: 72 MMHG | BODY MASS INDEX: 38.7 KG/M2 | WEIGHT: 240.8 LBS | TEMPERATURE: 97.8 F | HEIGHT: 66 IN | HEART RATE: 89 BPM | SYSTOLIC BLOOD PRESSURE: 138 MMHG | OXYGEN SATURATION: 92 %

## 2025-04-30 DIAGNOSIS — J45.901 MODERATE ASTHMA WITH ACUTE EXACERBATION, UNSPECIFIED WHETHER PERSISTENT: ICD-10-CM

## 2025-04-30 DIAGNOSIS — J45.40 MODERATE PERSISTENT ASTHMA, UNSPECIFIED WHETHER COMPLICATED: Primary | ICD-10-CM

## 2025-04-30 DIAGNOSIS — M79.89 SWELLING OF LOWER EXTREMITY: ICD-10-CM

## 2025-04-30 DIAGNOSIS — R06.83 LOUD SNORING: ICD-10-CM

## 2025-04-30 DIAGNOSIS — R06.2 WHEEZING: ICD-10-CM

## 2025-04-30 DIAGNOSIS — E66.812 OBESITY, CLASS II, BMI 35-39.9: ICD-10-CM

## 2025-04-30 DIAGNOSIS — R05.8 UPPER AIRWAY COUGH SYNDROME: ICD-10-CM

## 2025-04-30 DIAGNOSIS — J45.20 MILD INTERMITTENT ASTHMA, UNSPECIFIED WHETHER COMPLICATED: ICD-10-CM

## 2025-04-30 PROCEDURE — 99205 OFFICE O/P NEW HI 60 MIN: CPT | Performed by: INTERNAL MEDICINE

## 2025-04-30 RX ORDER — FLUTICASONE PROPIONATE AND SALMETEROL 100; 50 UG/1; UG/1
1 POWDER RESPIRATORY (INHALATION) 2 TIMES DAILY
Qty: 180 BLISTER | Refills: 0 | Status: SHIPPED | OUTPATIENT
Start: 2025-04-30 | End: 2025-07-29

## 2025-04-30 RX ORDER — BUDESONIDE AND FORMOTEROL FUMARATE DIHYDRATE 80; 4.5 UG/1; UG/1
2 AEROSOL RESPIRATORY (INHALATION) 2 TIMES DAILY
Qty: 30.6 G | Refills: 3 | Status: SHIPPED | OUTPATIENT
Start: 2025-04-30 | End: 2025-04-30

## 2025-04-30 RX ORDER — FLUTICASONE PROPIONATE 50 MCG
1 SPRAY, SUSPENSION (ML) NASAL DAILY
Qty: 9.9 ML | Refills: 3 | Status: SHIPPED | OUTPATIENT
Start: 2025-04-30

## 2025-04-30 RX ORDER — ZOLPIDEM TARTRATE 10 MG/1
10 TABLET ORAL DAILY PRN
COMMUNITY
Start: 2024-11-13 | End: 2025-05-12

## 2025-04-30 NOTE — PROGRESS NOTES
Pulmonary Consultation    Name: Miguel Lyles      : 1955      MRN: [id number]  Encounter Provider: Sean Dale DO  Encounter Date: 2025   Encounter department: [facility] [place]  :  Reason for Consultation:  History of Asthma    Requested by:  IDANIA Blake ([facility])    Assessment & Plan  Wheezing  Diagnosed with Asthma ~35 years ago, however patient believes he may have had asthma as a child but was not treated.     Plan:  Return to pulmonary clinic in 4 months  Obtain CBC with diff, BMP, NEAP  PFT with post bronchodilator response  Start Advair Diskus 100-50 mcg 1 puff BID  Instructed to rinse mouth after each use.  Will address vaccines at the next visit  Referral to internal medicine to establish care    Moderate persistent asthma, unspecified whether complicated    Orders:    Fluticasone-Salmeterol (Advair Diskus) 100-50 mcg/dose inhaler; Inhale 1 puff 2 (two) times a day Rinse mouth after use.    Upper airway cough syndrome    Plan:  Fluticasone nasal spray in AM  Neti Pot with distilled water and salt  Obesity, Class II, BMI 35-39.9    Plan:  Lifestyle modifications: weight loss  Swelling of lower extremity  Bilateral swelling    Plan:  BNP   Loud snoring  STOPBAN, [place] 14    Plan:  Home sleep study      History of Present Illness   HPI:  Miguel Lyles is a 69 y.o. male with PMHx of mild intermittent asthma, class II obesity, basal cell carcinoma who was referred to pulmonary clinic for wheezing. Recently seen in urgent care for wheezing and received DuoNeb. Patient then discharged with albuterol inhaler and prednisone. Ultimately he was referred to pulmonary medicine for further management.  Since his visit to urgent care patient has been using his albuterol inhaler 4-5 times per day out of necessity.  He also complains of PND, and postnasal drip.  He denies fever, chills, productive cough.  He denies any illicit drug use, tobacco use ever, alcohol  use.  Patient complains of excessive daytime sleepiness with a Caldwell score of 14 during this visit.    Symptoms during the week: 7/week  Nighttime symptoms: 0/week  FEV1: <70%?: no prior pft  Have you ever been hospitalized for Asthma exacerbation? no  Have you ever been intubated? no    Caldwell score: 14  Sitting and reading: 3  Watching TV: 3  Sitting inactive in a public place: 0  As a passenger of a car for an hour without a break: 3  Lying down to rest in the afternoon when circumstances permit: 2  Sitting quietly after lunch without alcohol: 2  In a car, while stopped for a few minutes in traffic:: 1    STOP-BAN  Snore loudly? Yes  Often feels tired, fatigued, or sleepy during the day: Yes (Caldwell 14)  Apnea has been observed by others: Yes  On antihypertensives: no  BMI >35: Yes  Age >50: Yes  Neck circumference: 18 in  Gender: 1 (male= 1)      Review of Systems   Constitutional:  Negative for activity change, diaphoresis and fever.   HENT:  Negative for congestion.    Respiratory:  Positive for cough, shortness of breath and wheezing. Negative for chest tightness and stridor.    Cardiovascular:  Positive for leg swelling. Negative for chest pain and palpitations.   Gastrointestinal:  Negative for abdominal distention.   Musculoskeletal:  Negative for arthralgias.   Neurological:  Negative for dizziness.   Psychiatric/Behavioral:  Negative for agitation.        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    Historical Information   No past medical history on file.  No past surgical history on file.  No family history on file.    Social History:   Social History     Tobacco Use   Smoking Status Not on file    Smokeless Tobacco Not on file       Meds/Allergies     Current Outpatient Medications:     albuterol (2.5 mg/3 mL) 0.083 % nebulizer solution, Take 3 mL (2.5 mg total) by nebulization every 6 (six) hours as needed for wheezing or shortness of breath, Disp: 75 mL, Rfl: 0    albuterol (2.5 mg/3 mL) 0.083 % nebulizer solution, Take 3 mL (2.5 mg total) by nebulization every 6 (six) hours as needed for wheezing or shortness of breath, Disp: 75 mL, Rfl: 0    albuterol (PROVENTIL HFA,VENTOLIN HFA) 90 mcg/act inhaler, INHALE 2 PUFFS EVERY 6 HOURS AS NEEDED FOR WHEEZING (Patient not taking: Reported on 10/16/2024), Disp: , Rfl:     predniSONE 10 mg tablet, Take 5 tablets (50 mg total) by mouth daily for 5 days, Disp: 25 tablet, Rfl: 0    predniSONE 20 mg tablet, Take 2 tablets (40 mg total) by mouth daily (Patient not taking: Reported on 10/22/2024), Disp: 10 tablet, Rfl: 0  Allergies   Allergen Reactions    Pollen Extract Nasal Congestion       Vitals: There were no vitals taken for this visit., There is no height or weight on file to calculate BMI.      Physical Exam  Physical Exam  HENT:      Nose: Nose normal.   Eyes:      Conjunctiva/sclera: Conjunctivae normal.   Cardiovascular:      Rate and Rhythm: Normal rate and regular rhythm.      Pulses: Normal pulses.      Heart sounds: No murmur heard.     No gallop.   Pulmonary:      Breath sounds: Wheezing present.   Abdominal:      General: Abdomen is flat. There is no distension.      Palpations: There is no mass.   Musculoskeletal:      Cervical back: Normal range of motion.      Right lower leg: Edema present.      Left lower leg: Edema present.   Skin:     General: Skin is warm.      Capillary Refill: Capillary refill takes less than 2 seconds.   Neurological:      General: No focal deficit present.      Mental Status: He is alert and oriented to person, place, and time.   Psychiatric:         Mood and Affect: Mood normal.         Labs: I have personally reviewed  "pertinent lab results.  No results found for: \"WBC\", \"HGB\", \"HCT\", \"MCV\", \"PLT\"  No results found for: \"GLUCOSE\", \"CALCIUM\", \"NA\", \"K\", \"CO2\", \"CL\", \"BUN\", \"CREATININE\"  No results found for: \"IGE\"  No results found for: \"ALT\", \"AST\", \"GGT\", \"ALKPHOS\", \"BILITOT\"    Imaging and other studies: Results Review Statement: I personally reviewed the following image studies/reports in PACS and discussed pertinent findings with Radiology: chest xray. My interpretation of the radiology images/reports is: no focal consolidation.  Patient was never admitted.    Pulmonary function testing: no previous     EKG, Pathology, and Other Studies: no prior ECHO    Disclaimer: Portions of the record may have been created with voice recognition software. Occasional wrong word or \"sound a like\" substitutions may have occurred due to the inherent limitations of voice recognition software. Careful consideration should be taken to recognize, using context, where substitutions have occurred.    Sean Clarke,    Pulmonary & Critical Care Medicine Fellow PGY-V  [facility]    "

## 2025-04-30 NOTE — LETTER
2025     Miguel Trupti    Patient: Miguel Lyles   YOB: 1955   Date of Visit: 2025       Dear Dr. Miguel Lyles:    Thank you for referring Miguel Lyles to me for evaluation. Below are my notes for this consultation.    If you have questions, please do not hesitate to call me. I look forward to following your patient along with you.         Sincerely,        Sean Dale DO        CC: No Recipients    Sean Dale DO  2025  4:43 PM  Attested  Pulmonary Consultation    Name: Miguel Lyles      : 1955      MRN: [id number]  Encounter Provider: Sean Dale DO  Encounter Date: 2025   Encounter department: [facility] [place]  :  Reason for Consultation:  History of Asthma    Requested by:  IDANIA Blake ([facility])    Assessment & Plan  Wheezing  Diagnosed with Asthma ~35 years ago, however patient believes he may have had asthma as a child but was not treated.     Plan:  Return to pulmonary clinic in 4 months  Obtain CBC with diff, BMP, NEAP  PFT with post bronchodilator response  Start Advair Diskus 100-50 mcg 1 puff BID  Instructed to rinse mouth after each use.  Will address vaccines at the next visit  Referral to internal medicine to establish care    Moderate persistent asthma, unspecified whether complicated    Orders:  •  Fluticasone-Salmeterol (Advair Diskus) 100-50 mcg/dose inhaler; Inhale 1 puff 2 (two) times a day Rinse mouth after use.    Upper airway cough syndrome    Plan:  Fluticasone nasal spray in AM  Neti Pot with distilled water and salt  Obesity, Class II, BMI 35-39.9    Plan:  Lifestyle modifications: weight loss  Swelling of lower extremity  Bilateral swelling    Plan:  BNP   Loud snoring  STOPBAN, [place] 14    Plan:  Home sleep study      History of Present Illness   HPI:  Miguel Lyles is a 69 y.o. male with PMHx of mild intermittent asthma, class II obesity, basal cell carcinoma who was referred to pulmonary clinic  for wheezing. Recently seen in urgent care for wheezing and received DuoNeb. Patient then discharged with albuterol inhaler and prednisone. Ultimately he was referred to pulmonary medicine for further management.  Since his visit to urgent care patient has been using his albuterol inhaler 4-5 times per day out of necessity.  He also complains of PND, and postnasal drip.  He denies fever, chills, productive cough.  He denies any illicit drug use, tobacco use ever, alcohol use.  Patient complains of excessive daytime sleepiness with a [place] score of 14 during this visit.    Symptoms during the week: 7/week  Nighttime symptoms: 0/week  FEV1: <70%?: no prior pft  Have you ever been hospitalized for Asthma exacerbation? no  Have you ever been intubated? no    [place] score: 14  Sitting and reading: 3  Watching TV: 3  Sitting inactive in a public place: 0  As a passenger of a car for an hour without a break: 3  Lying down to rest in the afternoon when circumstances permit: 2  Sitting quietly after lunch without alcohol: 2  In a car, while stopped for a few minutes in traffic:: 1    STOP-BAN  Snore loudly? Yes  Often feels tired, fatigued, or sleepy during the day: Yes ([place] 14)  Apnea has been observed by others: Yes  On antihypertensives: no  BMI >35: Yes  Age >50: Yes  Neck circumference: 18 in  Gender: 1 (male= 1)      Review of Systems   Constitutional:  Negative for activity change, diaphoresis and fever.   HENT:  Negative for congestion.    Respiratory:  Positive for cough, shortness of breath and wheezing. Negative for chest tightness and stridor.    Cardiovascular:  Positive for leg swelling. Negative for chest pain and palpitations.   Gastrointestinal:  Negative for abdominal distention.   Musculoskeletal:  Negative for arthralgias.   Neurological:  Negative for dizziness.   Psychiatric/Behavioral:  Negative for agitation.        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    Historical Information   No past medical history on file.  No past surgical history on file.  No family history on file.    Social History:   Social History     Tobacco Use   Smoking Status Not on file   Smokeless Tobacco Not on file       Meds/Allergies     Current Outpatient Medications:   •  albuterol (2.5 mg/3 mL) 0.083 % nebulizer solution, Take 3 mL (2.5 mg total) by nebulization every 6 (six) hours as needed for wheezing or shortness of breath, Disp: 75 mL, Rfl: 0  •  albuterol (2.5 mg/3 mL) 0.083 % nebulizer solution, Take 3 mL (2.5 mg total) by nebulization every 6 (six) hours as needed for wheezing or shortness of breath, Disp: 75 mL, Rfl: 0  •  albuterol (PROVENTIL HFA,VENTOLIN HFA) 90 mcg/act inhaler, INHALE 2 PUFFS EVERY 6 HOURS AS NEEDED FOR WHEEZING (Patient not taking: Reported on 10/16/2024), Disp: , Rfl:   •  predniSONE 10 mg tablet, Take 5 tablets (50 mg total) by mouth daily for 5 days, Disp: 25 tablet, Rfl: 0  •  predniSONE 20 mg tablet, Take 2 tablets (40 mg total) by mouth daily (Patient not taking: Reported on 10/22/2024), Disp: 10 tablet, Rfl: 0  Allergies   Allergen Reactions   • Pollen Extract Nasal Congestion       Vitals: There were no vitals taken for this visit., There is no height or weight on file to calculate BMI.      Physical Exam  Physical Exam  HENT:      Nose: Nose normal.   Eyes:      Conjunctiva/sclera: Conjunctivae normal.   Cardiovascular:      Rate and Rhythm: Normal rate and regular rhythm.      Pulses: Normal pulses.      Heart sounds: No murmur heard.     No gallop.   Pulmonary:      Breath sounds: Wheezing present.   Abdominal:      General: Abdomen is flat. There is no distension.       "Palpations: There is no mass.   Musculoskeletal:      Cervical back: Normal range of motion.      Right lower leg: Edema present.      Left lower leg: Edema present.   Skin:     General: Skin is warm.      Capillary Refill: Capillary refill takes less than 2 seconds.   Neurological:      General: No focal deficit present.      Mental Status: He is alert and oriented to person, place, and time.   Psychiatric:         Mood and Affect: Mood normal.         Labs: I have personally reviewed pertinent lab results.  No results found for: \"WBC\", \"HGB\", \"HCT\", \"MCV\", \"PLT\"  No results found for: \"GLUCOSE\", \"CALCIUM\", \"NA\", \"K\", \"CO2\", \"CL\", \"BUN\", \"CREATININE\"  No results found for: \"IGE\"  No results found for: \"ALT\", \"AST\", \"GGT\", \"ALKPHOS\", \"BILITOT\"    Imaging and other studies: Results Review Statement: I personally reviewed the following image studies/reports in PACS and discussed pertinent findings with Radiology: chest xray. My interpretation of the radiology images/reports is: no focal consolidation.  Patient was never admitted.    Pulmonary function testing: no previous     EKG, Pathology, and Other Studies: no prior ECHO    Disclaimer: Portions of the record may have been created with voice recognition software. Occasional wrong word or \"sound a like\" substitutions may have occurred due to the inherent limitations of voice recognition software. Careful consideration should be taken to recognize, using context, where substitutions have occurred.    Sean Clarke DO   Pulmonary & Critical Care Medicine Fellow PGY-V  [facility]    Attestation signed by Shashank Solis DO at 4/30/2025  4:52 PM:  Attending Attestation:    I reviewed the care furnished by the Resident/Fellow during the visit on 4/30/2025.  I was present in the office and personally saw and examined the patient.    Assessment/Plan  69 y.o. M with PMHx of f mild intermittent asthma, class II obesity, basal cell " "carcinoma who comes in for wheezing.   1.  Moderate asthma with wheezing        -  Check PFTs       -  Check northeast panel and CBC with differential       -  Start advair 100-50 BID    2.  Upper airway cough - start Flonase and nedipot        3.   Snoring/Excessive daytime sleepiness-  Mallampati class 3, Body mass index is 38.87 kg/m².,  .  He is at risk for obstructive sleep apnea based on STOP BANG survey.      -  Check a home study to assess for obstructive sleep apnea      -  I discussed in depth the diagnostic studies and treatment options involved with obstructive sleep apnea      -  I also discussed in depth the risk of leaving sleep apnea untreated including hypertension, heart failure, arrhythmia, MI and stroke.      -  The patient is agreeable to undergo testing and treatment of obstructive sleep apnea.  He understands that pitfalls she may encounter along the way and is willing to attempt CPAP treatment.     69 y.o. male with PMHx of mild intermittent asthma, class II obesity, basal cell carcinoma who was referred to pulmonary clinic for wheezing.  He had bene noting mores shortness of breath and wheezing.   He was using albuterol 4-5 times a week.   He also complains of PND, and postnasal drip.  He denies fever, chills, productive cough.  He denies any illicit drug use, tobacco use ever, alcohol use.  Patient complains of excessive daytime sleepiness with a [place] score of 14 during this visit.     Symptoms during the week: 7/week  Nighttime symptoms: 0/week  FEV1: <70%?: no prior pft  Have you ever been hospitalized for Asthma exacerbation? no  Have you ever been intubated? No    He also notes snoring and daytime sleepiness.  He denies witnessed apnea.  [place] 14.    /72 (BP Location: Left arm, Patient Position: Sitting, Cuff Size: Large)   Pulse 89   Temp 97.8 °F (36.6 °C) (Tympanic Core)   Ht 5' 6\" (1.676 m)   Wt 109 kg (240 lb 12.8 oz)   SpO2 92%   BMI 38.87 kg/m²   RA  General:  " "Patient is awake, alert, non-toxic and in no acute respiratory distress  Eyes: PERRL, no scleral icterus  Neck: No JVD  CV:  Regular, +S1 and S2, No murmurs, gallops or rubs appreciated  Lungs: Clear to auscultation bilateral without wheeze, rales or rhonci  Abdomen: Soft, +BS, Non-tender, non-distended  Extremities: No clubbing, cyanosis or edema  Neuro: No focal motor/sensory deficits  Skin: Warm, No rashes or ulcerations  No results found for: \"WBC\", \"HGB\", \"HCT\", \"MCV\", \"PLT\"  No results found for: \"SODIUM\", \"K\", \"CL\", \"CO2\", \"BUN\", \"CREATININE\", \"GLUC\", \"CALCIUM\"  No results found for: \"ALT\", \"AST\", \"GGT\", \"ALKPHOS\", \"BILITOT\"    CXR - MPRESSION:  No acute cardiopulmonary disease.        "

## 2025-04-30 NOTE — PATIENT INSTRUCTIONS
Please complete pulmonary function test, and obtain lab work.  Please complete home sleep study  We will refer you for establishing care with internal medicine

## 2025-04-30 NOTE — ASSESSMENT & PLAN NOTE
Diagnosed with Asthma ~35 years ago, however patient believes he may have had asthma as a child but was not treated.     Plan:  Return to pulmonary clinic in 4 months  Obtain CBC with diff, BMP, NEAP  PFT with post bronchodilator response  Start Advair Diskus 100-50 mcg 1 puff BID  Instructed to rinse mouth after each use.  Will address vaccines at the next visit  Referral to internal medicine to establish care

## 2025-05-02 ENCOUNTER — TRANSCRIBE ORDERS (OUTPATIENT)
Dept: SLEEP CENTER | Facility: CLINIC | Age: 70
End: 2025-05-02

## 2025-05-02 DIAGNOSIS — R06.83 SNORING: ICD-10-CM

## 2025-05-02 DIAGNOSIS — G47.8 OTHER SLEEP DISORDERS: Primary | ICD-10-CM

## 2025-06-22 DIAGNOSIS — R05.8 UPPER AIRWAY COUGH SYNDROME: ICD-10-CM

## 2025-06-23 RX ORDER — FLUTICASONE PROPIONATE 50 MCG
SPRAY, SUSPENSION (ML) NASAL
Qty: 24 ML | Refills: 3 | Status: SHIPPED | OUTPATIENT
Start: 2025-06-23

## 2025-07-17 ENCOUNTER — TELEPHONE (OUTPATIENT)
Dept: PULMONOLOGY | Facility: CLINIC | Age: 70
End: 2025-07-17

## 2025-07-17 NOTE — TELEPHONE ENCOUNTER
DWIGHTM for pt in regards to rescheduling 8/26 appt with Dr. Clarke as he will be out of office that day.

## 2025-07-23 PROBLEM — R73.01 IFG (IMPAIRED FASTING GLUCOSE): Status: ACTIVE | Noted: 2022-06-15

## 2025-07-23 PROBLEM — J45.20 ASTHMA, MILD INTERMITTENT: Status: ACTIVE | Noted: 2020-09-21

## 2025-07-24 ENCOUNTER — TELEPHONE (OUTPATIENT)
Dept: INTERNAL MEDICINE CLINIC | Facility: CLINIC | Age: 70
End: 2025-07-24